# Patient Record
Sex: FEMALE | Race: WHITE | NOT HISPANIC OR LATINO | Employment: FULL TIME | URBAN - METROPOLITAN AREA
[De-identification: names, ages, dates, MRNs, and addresses within clinical notes are randomized per-mention and may not be internally consistent; named-entity substitution may affect disease eponyms.]

---

## 2022-01-20 ENCOUNTER — TELEPHONE (OUTPATIENT)
Dept: PLASTIC SURGERY | Facility: CLINIC | Age: 32
End: 2022-01-20

## 2022-01-20 NOTE — TELEPHONE ENCOUNTER
----- Message from Lou Cross sent at 1/20/2022  1:23 PM CST -----  Type:  Needs Medical Advice    Who Called: Josefina Gonzalez    Symptoms (please be specific): -   How long has patient had these symptoms:  -  Pharmacy name and phone #:  -  Would the patient rather a call back or a response via [facility]?    Best Call Back Number: [phone]    Additional Information: pt would like to consult with you please call

## 2022-02-14 ENCOUNTER — OFFICE VISIT (OUTPATIENT)
Dept: PLASTIC SURGERY | Facility: CLINIC | Age: 32
End: 2022-02-14
Payer: COMMERCIAL

## 2022-02-14 VITALS
HEIGHT: 69 IN | BODY MASS INDEX: 27.7 KG/M2 | SYSTOLIC BLOOD PRESSURE: 122 MMHG | WEIGHT: 187 LBS | HEART RATE: 96 BPM | OXYGEN SATURATION: 99 % | DIASTOLIC BLOOD PRESSURE: 74 MMHG

## 2022-02-14 DIAGNOSIS — N64.81 BREAST PTOSIS: ICD-10-CM

## 2022-02-14 DIAGNOSIS — R63.4 EXCESSIVE BODY WEIGHT LOSS: Primary | ICD-10-CM

## 2022-02-14 PROCEDURE — 99499 UNLISTED E&M SERVICE: CPT | Mod: S$GLB,,, | Performed by: SURGERY

## 2022-02-14 PROCEDURE — 3008F PR BODY MASS INDEX (BMI) DOCUMENTED: ICD-10-PCS | Mod: CPTII,S$GLB,, | Performed by: SURGERY

## 2022-02-14 PROCEDURE — 1159F PR MEDICATION LIST DOCUMENTED IN MEDICAL RECORD: ICD-10-PCS | Mod: CPTII,S$GLB,, | Performed by: SURGERY

## 2022-02-14 PROCEDURE — 3078F PR MOST RECENT DIASTOLIC BLOOD PRESSURE < 80 MM HG: ICD-10-PCS | Mod: CPTII,S$GLB,, | Performed by: SURGERY

## 2022-02-14 PROCEDURE — 99499 NO LOS: ICD-10-PCS | Mod: S$GLB,,, | Performed by: SURGERY

## 2022-02-14 PROCEDURE — 3078F DIAST BP <80 MM HG: CPT | Mod: CPTII,S$GLB,, | Performed by: SURGERY

## 2022-02-14 PROCEDURE — 3074F SYST BP LT 130 MM HG: CPT | Mod: CPTII,S$GLB,, | Performed by: SURGERY

## 2022-02-14 PROCEDURE — 3074F PR MOST RECENT SYSTOLIC BLOOD PRESSURE < 130 MM HG: ICD-10-PCS | Mod: CPTII,S$GLB,, | Performed by: SURGERY

## 2022-02-14 PROCEDURE — 1159F MED LIST DOCD IN RCRD: CPT | Mod: CPTII,S$GLB,, | Performed by: SURGERY

## 2022-02-14 PROCEDURE — 3008F BODY MASS INDEX DOCD: CPT | Mod: CPTII,S$GLB,, | Performed by: SURGERY

## 2022-02-14 NOTE — PROGRESS NOTES
CONSULTATION NOTE  ?  CC  Chief Complaint   Patient presents with    Surgical Consult     Cosmetic surgery     ?  Referring Provider  Self  ?  HPI  Josefina Gonzalez is a 31 y.o. yo female who is here for evaluation for cosmetic surgery.   Pt is interested in an abdominoplasty after having gastric sleeve surgery last year and loosing over 100lbs. Her weight has been stable for 6 months.     Heaviest weight: 294lbs  Weight loss last 6 mo: 40lbs  Body mass index is 27.62    She is G 2 P3. No plans for future pregnancy.      Smoking history: never    No bleeding or clotting issues.     Prior surgery to abdomen: Transverse Csection    No personal or family history of breast cancer in first degree relative.     Occupation: self employed      PMH  No past medical history on file.        PS  Past Surgical History:   Procedure Laterality Date     SECTION  2016    gastric sleeve  2021         FHX  Family History   Problem Relation Age of Onset    Colon cancer Maternal Grandfather     Colon cancer Other             MEDICATIONS  No current outpatient medications on file prior to visit.     No current facility-administered medications on file prior to visit.           ALLERGIES   Review of patient's allergies indicates:  Not on File      Social History  Social History     Socioeconomic History    Marital status:            ROS  Review of Systems   Constitutional: Negative for chills and fever.   HENT: Negative for congestion.    Eyes: Negative for blurred vision and double vision.   Respiratory: Negative for cough and shortness of breath.    Cardiovascular: Negative for chest pain and palpitations.   Gastrointestinal: Negative for abdominal pain, nausea and vomiting.   Genitourinary: Negative for dysuria and frequency.   Musculoskeletal: Negative for back pain and neck pain.   Skin: Negative for itching and rash.   Neurological: Negative for dizziness, seizures and headaches.   Endo/Heme/Allergies: Does not  "bruise/bleed easily.   Psychiatric/Behavioral: Negative for depression and substance abuse.          Physical Exam  /74   Pulse 96   Ht 5' 9" (1.753 m)   Wt 84.8 kg (187 lb)   SpO2 99%   BMI 27.62 kg/m²         Constitutional:       General: She is not in acute distress.     Appearance: She is not diaphoretic.   HENT:      Head: Normocephalic and atraumatic.   Eyes:      General: No scleral icterus.     Conjunctiva/sclera: Conjunctivae normal.   Cardiovascular:      Rate and Rhythm: Normal rate.   Pulmonary:      Effort: Pulmonary effort is normal. No respiratory distress.      Breath sounds: No stridor.   Abdominal:      General: There is no distension.      Palpations: Abdomen is soft.      Tenderness: There is no abdominal tenderness.   Musculoskeletal:         General: No tenderness. Normal range of motion.      Cervical back: Normal range of motion.   Skin:     General: Skin is warm.      Findings: No erythema or rash.   Neurological:      Mental Status: She is alert and oriented to person, place, and time.   Psychiatric:         Mood and Affect: Affect normal.         Judgment: Judgment normal.       Bilateral breast ptosis grade 3 no mass or skin change, no nipple discharge 450g breast  Abdominal skin excess  Rectus diastasis  Skin pinch 2cm  No hernias appreciated  Mons pubis with some descent  Lower body with excess skin elasticity poor      IMPRESSION:  Massive weight loss with skin excess       PLAN  She has no further plans for weight reduction.     Option 1   Circumferential Lower body lift 5 h    Option 2  lipoAbdominoplasty mastopexy 5.5h    Option 3   Mastopexy aug with fat 3.5h    Quotes will be provided. Baseline mammogram will be needed as well as nutritional labs.     "

## 2022-02-23 ENCOUNTER — TELEPHONE (OUTPATIENT)
Dept: PLASTIC SURGERY | Facility: CLINIC | Age: 32
End: 2022-02-23
Payer: COMMERCIAL

## 2022-02-23 NOTE — TELEPHONE ENCOUNTER
----- Message from Tanya Rodriguez sent at 2/23/2022  9:11 AM CST -----  Regarding: pt advice  Contact: pt  Pt is calling to check status on quote for the procedure. Please call back at [phone]//thank you acc